# Patient Record
Sex: MALE | Race: WHITE | NOT HISPANIC OR LATINO | Employment: FULL TIME | URBAN - METROPOLITAN AREA
[De-identification: names, ages, dates, MRNs, and addresses within clinical notes are randomized per-mention and may not be internally consistent; named-entity substitution may affect disease eponyms.]

---

## 2024-08-14 DIAGNOSIS — R51.9 HEADACHE: Primary | ICD-10-CM

## 2024-09-05 ENCOUNTER — OFFICE VISIT (OUTPATIENT)
Dept: NEUROLOGY | Facility: CLINIC | Age: 24
End: 2024-09-05
Payer: COMMERCIAL

## 2024-09-05 VITALS
BODY MASS INDEX: 24.2 KG/M2 | DIASTOLIC BLOOD PRESSURE: 84 MMHG | WEIGHT: 154.19 LBS | SYSTOLIC BLOOD PRESSURE: 134 MMHG | HEART RATE: 85 BPM | HEIGHT: 67 IN

## 2024-09-05 DIAGNOSIS — R51.9 HEADACHE: ICD-10-CM

## 2024-09-05 DIAGNOSIS — G43.909 MIGRAINE WITHOUT STATUS MIGRAINOSUS, NOT INTRACTABLE, UNSPECIFIED MIGRAINE TYPE: Primary | ICD-10-CM

## 2024-09-05 PROCEDURE — 1160F RVW MEDS BY RX/DR IN RCRD: CPT | Mod: CPTII,S$GLB,, | Performed by: NURSE PRACTITIONER

## 2024-09-05 PROCEDURE — 1159F MED LIST DOCD IN RCRD: CPT | Mod: CPTII,S$GLB,, | Performed by: NURSE PRACTITIONER

## 2024-09-05 PROCEDURE — 3008F BODY MASS INDEX DOCD: CPT | Mod: CPTII,S$GLB,, | Performed by: NURSE PRACTITIONER

## 2024-09-05 PROCEDURE — 99999 PR PBB SHADOW E&M-EST. PATIENT-LVL III: CPT | Mod: PBBFAC,,, | Performed by: NURSE PRACTITIONER

## 2024-09-05 PROCEDURE — 99204 OFFICE O/P NEW MOD 45 MIN: CPT | Mod: S$GLB,,, | Performed by: NURSE PRACTITIONER

## 2024-09-05 PROCEDURE — 3079F DIAST BP 80-89 MM HG: CPT | Mod: CPTII,S$GLB,, | Performed by: NURSE PRACTITIONER

## 2024-09-05 PROCEDURE — 3075F SYST BP GE 130 - 139MM HG: CPT | Mod: CPTII,S$GLB,, | Performed by: NURSE PRACTITIONER

## 2024-09-05 RX ORDER — RIMEGEPANT SULFATE 75 MG/75MG
75 TABLET, ORALLY DISINTEGRATING ORAL ONCE AS NEEDED
COMMUNITY
End: 2024-09-05 | Stop reason: SDUPTHER

## 2024-09-05 RX ORDER — AMITRIPTYLINE HYDROCHLORIDE 25 MG/1
1 TABLET, FILM COATED ORAL DAILY
COMMUNITY

## 2024-09-05 RX ORDER — RIMEGEPANT SULFATE 75 MG/75MG
75 TABLET, ORALLY DISINTEGRATING ORAL
Qty: 16 TABLET | Refills: 5 | Status: SHIPPED | OUTPATIENT
Start: 2024-09-05

## 2024-09-05 RX ORDER — FREMANEZUMAB-VFRM 225 MG/1.5ML
225 INJECTION SUBCUTANEOUS
Qty: 1.5 ML | Refills: 5 | Status: SHIPPED | OUTPATIENT
Start: 2024-09-05

## 2024-09-05 RX ORDER — LEVOCETIRIZINE DIHYDROCHLORIDE 5 MG/1
1 TABLET, FILM COATED ORAL DAILY
COMMUNITY

## 2024-09-05 NOTE — PROGRESS NOTES
Subjective:       Patient ID: Aidan Lejeune is a 24 y.o. male.    Chief Complaint: Migraine (New PT referred by aSrah Harvey NP for migraines, first started with migraines in 2013, he gets aura before he gets a migraine, they normally last 1-3 hrs, he takes Nurtec states that it does work, he normally goes to sleep and then they are gone. Since July he's been getting them more frequently, on 25mg of elavil and feels like its no longer working. )      History of Present Illness:  Patient referred by Sarah Harvey NP to evaluate for migraines.  24-year-old man who started having migraines at the age of 14.  They generally would occur once every couple of months, but over the last 6 or so months, he has had an increase in migraine frequency.  He is now getting about 1 a week.  He attributes this to a change in his job where he is more stressed.  His migraines always start with an aura in the left eye.  Says he loses vision in the left visual field this is then followed by pain that presents in either the left occipital region or retro orbitally on the left.  The pain is a severe pressure with associated phonophobia, photophobia, and nausea.  He was seeing another neurologist prior to establishing here.  He was tried on propranolol which was not effective.  He also tried Imitrex and rizatriptan which were only moderately helpful and caused side effects.  He is currently on Elavil which initially helped, but is no longer effective.  He is now taking Nurtec as rescue which does help to stop the head pain from occurring, but does not prevent the aura which is the most debilitating feature.  He has had to miss work for several of the migraines.  No specific triggers apart from stress.  Mom and grandmother have migraines.            Review of Systems  I have reviewed a 12 point ROS which is scanned into the chart        Past Medical History:   Diagnosis Date    Migraines        History reviewed. No pertinent surgical  "history.     Family History   Problem Relation Name Age of Onset    Migraines Mother      No Known Problems Father      No Known Problems Sister      No Known Problems Brother          Social History     Socioeconomic History    Marital status: Single   Tobacco Use    Smoking status: Never    Smokeless tobacco: Never    Tobacco comments:     Nicotine pouches    Substance and Sexual Activity    Alcohol use: Yes     Alcohol/week: 2.0 standard drinks of alcohol     Types: 2 Cans of beer per week     Comment: socially    Drug use: Never    Sexual activity: Not Currently        Outpatient Encounter Medications as of 9/5/2024   Medication Sig Dispense Refill    amitriptyline (ELAVIL) 25 MG tablet Take 1 tablet by mouth once daily.      levocetirizine (XYZAL) 5 MG tablet Take 1 tablet by mouth once daily.      [DISCONTINUED] NURTEC 75 mg odt Take 75 mg by mouth once as needed for Migraine.      fremanezumab-vfrm (AJOVY AUTOINJECTOR) 225 mg/1.5 mL autoinjector Inject 1.5 mLs (225 mg total) into the skin every 28 days. 1.5 mL 5    NURTEC 75 mg odt Take 1 tablet (75 mg total) by mouth as needed for Migraine. 16 tablet 5     No facility-administered encounter medications on file as of 9/5/2024.      Objective:   /84 (BP Location: Right arm)   Pulse 85   Ht 5' 7" (1.702 m)   Wt 69.9 kg (154 lb 3.2 oz)   BMI 24.15 kg/m²        Physical Exam  General:  Alert and oriented  NAD  No overt edema    Cognition and Comprehension:  Speech and language intact  Follows commands    Cranial nerves:   CN 2_ Visual fields (full to confrontation both eyes)  CN 3, 4, 6_ Intact, NELI, no nystagmus  CN 5_facial tactile sensation intact  CN 7_no face asymmetry; normal eye closure and smile  CN 8_hearing intact to spoken voice  CN 9, 10, 11_voice normal, shoulder shrug ok; deltoids not fatigable   CN 12 tongue_protrudes mid line    Muscle Strength and Tone:  Normal upper extremity tone  Normal lower extremity tone  Normal upper extremity " strength  Normal lower extremity strength    Reflexes:  Normal and symmetric    Sensation:  Intact to light touch and temperature    Coordination and Gait:  Coordination and gait are normal   No ataxia      Assessment & Plan:      1. Migraine without status migrainosus, not intractable, unspecified migraine type  Overview:  Started having migraines at age 14.  migraines always start with an aura in the left eye.  Says he loses vision in the left visual field this is then followed by pain that presents in either the left occipital region or retro orbitally on the left.  The pain is a severe pressure with associated phonophobia, photophobia, and nausea.  He has previously tried propranolol, elavil, imitrex, rizatriptan, tosymra.      Assessment & Plan:  -Trial of ajovy for ppx.  Continue nurtec for rescue    Orders:  -     fremanezumab-vfrm (AJOVY AUTOINJECTOR) 225 mg/1.5 mL autoinjector; Inject 1.5 mLs (225 mg total) into the skin every 28 days.  Dispense: 1.5 mL; Refill: 5  -     NURTEC 75 mg odt; Take 1 tablet (75 mg total) by mouth as needed for Migraine.  Dispense: 16 tablet; Refill: 5    2. Headache  -     Ambulatory referral/consult to Neurology          This note was created with the assistance of voice recognition software. There may be transcription errors as a result of using this technology however minimal. Effort has been made to assure accuracy of transcription but any obvious errors or omissions should be clarified with the author of the document.

## 2024-12-05 ENCOUNTER — OFFICE VISIT (OUTPATIENT)
Dept: NEUROLOGY | Facility: CLINIC | Age: 24
End: 2024-12-05
Payer: COMMERCIAL

## 2024-12-05 VITALS
DIASTOLIC BLOOD PRESSURE: 83 MMHG | WEIGHT: 158 LBS | HEART RATE: 84 BPM | BODY MASS INDEX: 24.8 KG/M2 | HEIGHT: 67 IN | SYSTOLIC BLOOD PRESSURE: 120 MMHG

## 2024-12-05 DIAGNOSIS — G43.909 MIGRAINE WITHOUT STATUS MIGRAINOSUS, NOT INTRACTABLE, UNSPECIFIED MIGRAINE TYPE: Primary | ICD-10-CM

## 2024-12-05 PROCEDURE — 3008F BODY MASS INDEX DOCD: CPT | Mod: CPTII,S$GLB,, | Performed by: NURSE PRACTITIONER

## 2024-12-05 PROCEDURE — 3074F SYST BP LT 130 MM HG: CPT | Mod: CPTII,S$GLB,, | Performed by: NURSE PRACTITIONER

## 2024-12-05 PROCEDURE — 1160F RVW MEDS BY RX/DR IN RCRD: CPT | Mod: CPTII,S$GLB,, | Performed by: NURSE PRACTITIONER

## 2024-12-05 PROCEDURE — 1159F MED LIST DOCD IN RCRD: CPT | Mod: CPTII,S$GLB,, | Performed by: NURSE PRACTITIONER

## 2024-12-05 PROCEDURE — 99999 PR PBB SHADOW E&M-EST. PATIENT-LVL III: CPT | Mod: PBBFAC,,, | Performed by: NURSE PRACTITIONER

## 2024-12-05 PROCEDURE — 3079F DIAST BP 80-89 MM HG: CPT | Mod: CPTII,S$GLB,, | Performed by: NURSE PRACTITIONER

## 2024-12-05 PROCEDURE — 99214 OFFICE O/P EST MOD 30 MIN: CPT | Mod: S$GLB,,, | Performed by: NURSE PRACTITIONER

## 2024-12-05 RX ORDER — KETOROLAC TROMETHAMINE 10 MG/1
10 TABLET, FILM COATED ORAL EVERY 6 HOURS PRN
Qty: 12 TABLET | Refills: 0 | Status: SHIPPED | OUTPATIENT
Start: 2024-12-05

## 2024-12-05 RX ORDER — ONDANSETRON HYDROCHLORIDE 8 MG/1
8 TABLET, FILM COATED ORAL EVERY 6 HOURS PRN
Qty: 12 TABLET | Refills: 0 | Status: SHIPPED | OUTPATIENT
Start: 2024-12-05

## 2024-12-05 NOTE — ASSESSMENT & PLAN NOTE
-continue ajovy.  Try ubrelvy for rescue.  Will also give back up with migraine cocktail given severity of migraines

## 2024-12-05 NOTE — PROGRESS NOTES
Subjective:       Patient ID: Aidan Lejeune is a 24 y.o. male.    Chief Complaint: Migraine (Migraines has decrease. Gets at least one migraine a month 5 - 8 days before injection is due. Intensity of  last 2 migraines were bad, lasting for at least 5 hours.  Little bit of nausea with migraines. Gets an aura before migraines. Zigzag lines with a blind spot. )      History of Present Illness:  Follow-up visit for migraine.  He was started on Ajovy at last visit.  Recall, he was having 1 migraine a week prior to Ajovy.  Now he is only having 1 migraine a month and it is roughly 5-8 days before his next injection.  He does do the injections every 28 days.  He finds that the migraine that occurs when the medication is wearing off is more severe than the migraines he has had in the past.  He they are not responding to Nurtec.  The last up to 5 hours.  They still start with a visual aura of zigzags and are followed by severe head pain that completely debilitated him.  His last 1, his wife had to pick him up from work.                  Past Medical History:   Diagnosis Date    Migraines        History reviewed. No pertinent surgical history.     Family History   Problem Relation Name Age of Onset    Migraines Mother      No Known Problems Father      No Known Problems Sister      No Known Problems Brother          Social History     Socioeconomic History    Marital status: Single   Tobacco Use    Smoking status: Never    Smokeless tobacco: Never    Tobacco comments:     Nicotine pouches    Substance and Sexual Activity    Alcohol use: Yes     Alcohol/week: 2.0 standard drinks of alcohol     Types: 2 Cans of beer per week     Comment: socially    Drug use: Never    Sexual activity: Not Currently        Outpatient Encounter Medications as of 12/5/2024   Medication Sig Dispense Refill    amitriptyline (ELAVIL) 25 MG tablet Take 1 tablet by mouth once daily.      fremanezumab-vfrm (AJOVY AUTOINJECTOR) 225 mg/1.5 mL  "autoinjector Inject 1.5 mLs (225 mg total) into the skin every 28 days. 1.5 mL 5    levocetirizine (XYZAL) 5 MG tablet Take 1 tablet by mouth once daily.      [DISCONTINUED] NURTEC 75 mg odt Take 1 tablet (75 mg total) by mouth as needed for Migraine. 16 tablet 5    ubrogepant (UBRELVY) 100 mg tablet Take 1 tablet (100 mg total) by mouth as needed for Migraine (can repeat dose in 2 hour if needed, do not exceed 200 mg in 24 hours). 16 tablet 5     No facility-administered encounter medications on file as of 12/5/2024.      Objective:   /83 (BP Location: Right arm, Patient Position: Sitting)   Pulse 84   Ht 5' 7" (1.702 m)   Wt 71.7 kg (158 lb)   BMI 24.75 kg/m²        Physical Exam  NAD  alert and oriented  cognition and perception intact  no aphasia  EOMI  no facial asymmetry  no dysarthria  moves all extremities symmetrically  no gross coordination abnormalities  gait normal       Assessment & Plan:      1. Migraine without status migrainosus, not intractable, unspecified migraine type  Overview:  Started having migraines at age 14.  migraines always start with an aura in the left eye.  Says he loses vision in the left visual field this is then followed by pain that presents in either the left occipital region or retro orbitally on the left.  The pain is a severe pressure with associated phonophobia, photophobia, and nausea.  He has previously tried propranolol, elavil, imitrex, rizatriptan, tosymra.      Assessment & Plan:  -continue ajovy.  Try ubrelvy for rescue.  Will also give back up with migraine cocktail given severity of migraines    Orders:  -     ubrogepant (UBRELVY) 100 mg tablet; Take 1 tablet (100 mg total) by mouth as needed for Migraine (can repeat dose in 2 hour if needed, do not exceed 200 mg in 24 hours).  Dispense: 16 tablet; Refill: 5          This note was created with the assistance of voice recognition software. There may be transcription errors as a result of using this " technology however minimal. Effort has been made to assure accuracy of transcription but any obvious errors or omissions should be clarified with the author of the document.

## 2025-01-17 DIAGNOSIS — G43.909 MIGRAINE WITHOUT STATUS MIGRAINOSUS, NOT INTRACTABLE, UNSPECIFIED MIGRAINE TYPE: ICD-10-CM

## 2025-01-17 RX ORDER — FREMANEZUMAB-VFRM 225 MG/1.5ML
225 INJECTION SUBCUTANEOUS
Qty: 1.5 ML | Refills: 5 | Status: SHIPPED | OUTPATIENT
Start: 2025-01-17

## 2025-03-12 ENCOUNTER — OFFICE VISIT (OUTPATIENT)
Dept: NEUROLOGY | Facility: CLINIC | Age: 25
End: 2025-03-12
Payer: COMMERCIAL

## 2025-03-12 VITALS
WEIGHT: 158 LBS | BODY MASS INDEX: 24.8 KG/M2 | SYSTOLIC BLOOD PRESSURE: 120 MMHG | HEART RATE: 84 BPM | DIASTOLIC BLOOD PRESSURE: 78 MMHG | HEIGHT: 67 IN

## 2025-03-12 DIAGNOSIS — G43.009 MIGRAINE WITHOUT AURA AND WITHOUT STATUS MIGRAINOSUS, NOT INTRACTABLE: Primary | ICD-10-CM

## 2025-03-12 PROCEDURE — 1159F MED LIST DOCD IN RCRD: CPT | Mod: CPTII,S$GLB,, | Performed by: NURSE PRACTITIONER

## 2025-03-12 PROCEDURE — 3074F SYST BP LT 130 MM HG: CPT | Mod: CPTII,S$GLB,, | Performed by: NURSE PRACTITIONER

## 2025-03-12 PROCEDURE — 99213 OFFICE O/P EST LOW 20 MIN: CPT | Mod: S$GLB,,, | Performed by: NURSE PRACTITIONER

## 2025-03-12 PROCEDURE — 3078F DIAST BP <80 MM HG: CPT | Mod: CPTII,S$GLB,, | Performed by: NURSE PRACTITIONER

## 2025-03-12 PROCEDURE — 3008F BODY MASS INDEX DOCD: CPT | Mod: CPTII,S$GLB,, | Performed by: NURSE PRACTITIONER

## 2025-03-12 PROCEDURE — 99999 PR PBB SHADOW E&M-EST. PATIENT-LVL III: CPT | Mod: PBBFAC,,, | Performed by: NURSE PRACTITIONER

## 2025-03-12 RX ORDER — LANOLIN ALCOHOL/MO/W.PET/CERES
400 CREAM (GRAM) TOPICAL NIGHTLY
COMMUNITY

## 2025-03-12 NOTE — ASSESSMENT & PLAN NOTE
-continue ajovy and prn ubrelvy  -has migraine cocktail for debilitating migraines, but has not had to use

## 2025-03-12 NOTE — PROGRESS NOTES
"Subjective:       Patient ID: Aidan Lejeune is a 24 y.o. male.    Chief Complaint: Migraine (Last month did not have any migraines. Prior to that was having 1 migraine a month. Ubrelvy does helps with migraines.)      History of Present Illness:  Follow-up visit for migraine.  He is still on Ajovy.  Last month, he had no migraines.  This month, he has had 1 migraine, but it was less severe been his historical migraines.  He took Ubrelvy and the migraine was gone within about an hour.  He has not had to leave work with the migraine since last visit.  He has not had to take the migraine cocktail since last visit.              Past Medical History:   Diagnosis Date    Migraines        History reviewed. No pertinent surgical history.     Family History   Problem Relation Name Age of Onset    Migraines Mother      No Known Problems Father      No Known Problems Sister      No Known Problems Brother          Social History[1]     Encounter Medications[2]   Objective:   /78 (BP Location: Right arm, Patient Position: Sitting)   Pulse 84   Ht 5' 7" (1.702 m)   Wt 71.7 kg (158 lb)   BMI 24.75 kg/m²        Physical Exam  NAD  alert and oriented  cognition and perception intact  no aphasia  EOMI  no facial asymmetry  no dysarthria  moves all extremities symmetrically  no gross coordination abnormalities  gait normal       Assessment & Plan:      1. Migraine without aura and without status migrainosus, not intractable  Overview:  Started having migraines at age 14.  migraines always start with an aura in the left eye.  Says he loses vision in the left visual field this is then followed by pain that presents in either the left occipital region or retro orbitally on the left.  The pain is a severe pressure with associated phonophobia, photophobia, and nausea.  He has previously tried propranolol, elavil, imitrex, rizatriptan, tosymra.      Assessment & Plan:  -continue ajovy and prn ubrelvy  -has migraine cocktail for " debilitating migraines, but has not had to use            This note was created with the assistance of voice recognition software. There may be transcription errors as a result of using this technology however minimal. Effort has been made to assure accuracy of transcription but any obvious errors or omissions should be clarified with the author of the document.           [1]   Social History  Socioeconomic History    Marital status: Single   Tobacco Use    Smoking status: Never    Smokeless tobacco: Never    Tobacco comments:     Nicotine pouches    Substance and Sexual Activity    Alcohol use: Yes     Alcohol/week: 2.0 standard drinks of alcohol     Types: 2 Cans of beer per week     Comment: socially    Drug use: Never    Sexual activity: Not Currently   [2]   Outpatient Encounter Medications as of 3/12/2025   Medication Sig Dispense Refill    amitriptyline (ELAVIL) 25 MG tablet Take 1 tablet by mouth once daily.      fremanezumab-vfrm (AJOVY AUTOINJECTOR) 225 mg/1.5 mL autoinjector Inject 1.5 mLs (225 mg total) into the skin every 28 days. 1.5 mL 5    ketorolac (TORADOL) 10 mg tablet Take 1 tablet (10 mg total) by mouth every 6 (six) hours as needed for Pain (migraine). Migraine cocktail to take with zofran and benadryl 12 tablet 0    magnesium oxide (MAG-OX) 400 mg (241.3 mg magnesium) tablet Take 400 mg by mouth every evening.      ondansetron (ZOFRAN) 8 MG tablet Take 1 tablet (8 mg total) by mouth every 6 (six) hours as needed for Nausea (migraine). Migraine cocktail to be taken with ketorolac and benadryl 12 tablet 0    ubrogepant (UBRELVY) 100 mg tablet Take 1 tablet (100 mg total) by mouth as needed for Migraine (can repeat dose in 2 hour if needed, do not exceed 200 mg in 24 hours). 16 tablet 5    levocetirizine (XYZAL) 5 MG tablet Take 1 tablet by mouth once daily. (Patient not taking: Reported on 3/12/2025)       No facility-administered encounter medications on file as of 3/12/2025.

## 2025-04-30 DIAGNOSIS — G43.909 MIGRAINE WITHOUT STATUS MIGRAINOSUS, NOT INTRACTABLE, UNSPECIFIED MIGRAINE TYPE: ICD-10-CM

## 2025-07-14 DIAGNOSIS — G43.909 MIGRAINE WITHOUT STATUS MIGRAINOSUS, NOT INTRACTABLE, UNSPECIFIED MIGRAINE TYPE: ICD-10-CM

## 2025-07-14 RX ORDER — FREMANEZUMAB-VFRM 225 MG/1.5ML
INJECTION SUBCUTANEOUS
Qty: 1.5 ML | Refills: 5 | Status: SHIPPED | OUTPATIENT
Start: 2025-07-14